# Patient Record
Sex: FEMALE | Race: BLACK OR AFRICAN AMERICAN | NOT HISPANIC OR LATINO | Employment: OTHER | URBAN - METROPOLITAN AREA
[De-identification: names, ages, dates, MRNs, and addresses within clinical notes are randomized per-mention and may not be internally consistent; named-entity substitution may affect disease eponyms.]

---

## 2023-11-22 ENCOUNTER — HOSPITAL ENCOUNTER (EMERGENCY)
Facility: CLINIC | Age: 68
Discharge: HOME | End: 2023-11-22
Attending: EMERGENCY MEDICINE | Admitting: EMERGENCY MEDICINE
Payer: COMMERCIAL

## 2023-11-22 VITALS
HEART RATE: 71 BPM | OXYGEN SATURATION: 97 % | DIASTOLIC BLOOD PRESSURE: 90 MMHG | TEMPERATURE: 97.6 F | RESPIRATION RATE: 16 BRPM | SYSTOLIC BLOOD PRESSURE: 155 MMHG

## 2023-11-22 DIAGNOSIS — R07.89 OTHER CHEST PAIN: ICD-10-CM

## 2023-11-22 LAB
ANION GAP SERPL CALCULATED.3IONS-SCNC: 10 MMOL/L (ref 7–15)
BASOPHILS # BLD AUTO: 0.1 10E3/UL (ref 0–0.2)
BASOPHILS NFR BLD AUTO: 1 %
BUN SERPL-MCNC: 16.5 MG/DL (ref 8–23)
CALCIUM SERPL-MCNC: 9.9 MG/DL (ref 8.8–10.2)
CHLORIDE SERPL-SCNC: 102 MMOL/L (ref 98–107)
CREAT SERPL-MCNC: 0.78 MG/DL (ref 0.51–0.95)
DEPRECATED HCO3 PLAS-SCNC: 29 MMOL/L (ref 22–29)
EGFRCR SERPLBLD CKD-EPI 2021: 83 ML/MIN/1.73M2
EOSINOPHIL # BLD AUTO: 0.1 10E3/UL (ref 0–0.7)
EOSINOPHIL NFR BLD AUTO: 2 %
ERYTHROCYTE [DISTWIDTH] IN BLOOD BY AUTOMATED COUNT: 11.9 % (ref 10–15)
GLUCOSE SERPL-MCNC: 98 MG/DL (ref 70–99)
HCT VFR BLD AUTO: 42.6 % (ref 35–47)
HGB BLD-MCNC: 14.7 G/DL (ref 11.7–15.7)
IMM GRANULOCYTES # BLD: 0 10E3/UL
IMM GRANULOCYTES NFR BLD: 0 %
LYMPHOCYTES # BLD AUTO: 2 10E3/UL (ref 0.8–5.3)
LYMPHOCYTES NFR BLD AUTO: 36 %
MCH RBC QN AUTO: 30.3 PG (ref 26.5–33)
MCHC RBC AUTO-ENTMCNC: 34.5 G/DL (ref 31.5–36.5)
MCV RBC AUTO: 88 FL (ref 78–100)
MONOCYTES # BLD AUTO: 0.4 10E3/UL (ref 0–1.3)
MONOCYTES NFR BLD AUTO: 7 %
NEUTROPHILS # BLD AUTO: 3.1 10E3/UL (ref 1.6–8.3)
NEUTROPHILS NFR BLD AUTO: 54 %
NRBC # BLD AUTO: 0 10E3/UL
NRBC BLD AUTO-RTO: 0 /100
PLATELET # BLD AUTO: 342 10E3/UL (ref 150–450)
POTASSIUM SERPL-SCNC: 4.6 MMOL/L (ref 3.4–5.3)
RBC # BLD AUTO: 4.85 10E6/UL (ref 3.8–5.2)
SODIUM SERPL-SCNC: 141 MMOL/L (ref 135–145)
TROPONIN T SERPL HS-MCNC: 6 NG/L
WBC # BLD AUTO: 5.7 10E3/UL (ref 4–11)

## 2023-11-22 PROCEDURE — 96372 THER/PROPH/DIAG INJ SC/IM: CPT | Performed by: EMERGENCY MEDICINE

## 2023-11-22 PROCEDURE — 99284 EMERGENCY DEPT VISIT MOD MDM: CPT

## 2023-11-22 PROCEDURE — 93005 ELECTROCARDIOGRAM TRACING: CPT

## 2023-11-22 PROCEDURE — 250N000011 HC RX IP 250 OP 636: Mod: JZ | Performed by: EMERGENCY MEDICINE

## 2023-11-22 PROCEDURE — 80048 BASIC METABOLIC PNL TOTAL CA: CPT | Performed by: EMERGENCY MEDICINE

## 2023-11-22 PROCEDURE — 36415 COLL VENOUS BLD VENIPUNCTURE: CPT | Performed by: EMERGENCY MEDICINE

## 2023-11-22 PROCEDURE — 85025 COMPLETE CBC W/AUTO DIFF WBC: CPT | Performed by: EMERGENCY MEDICINE

## 2023-11-22 PROCEDURE — 84484 ASSAY OF TROPONIN QUANT: CPT | Performed by: EMERGENCY MEDICINE

## 2023-11-22 RX ORDER — KETOROLAC TROMETHAMINE 15 MG/ML
15 INJECTION, SOLUTION INTRAMUSCULAR; INTRAVENOUS ONCE
Status: COMPLETED | OUTPATIENT
Start: 2023-11-22 | End: 2023-11-22

## 2023-11-22 RX ADMIN — KETOROLAC TROMETHAMINE 15 MG: 15 INJECTION, SOLUTION INTRAMUSCULAR; INTRAVENOUS at 18:37

## 2023-11-22 ASSESSMENT — ACTIVITIES OF DAILY LIVING (ADL): ADLS_ACUITY_SCORE: 35

## 2023-11-22 NOTE — ED TRIAGE NOTES
Left sided chest pain x 2 weeks. Seen in urgent care 1 week ago and a EKG and chest XR were competed. Came to ER tonight as chest pain continues. Pain has not worsened.

## 2023-11-23 NOTE — ED PROVIDER NOTES
History     Chief Complaint:  Chest Pain       The history is provided by the patient.      Carol Pierce is a 68 year old female with a history of hypertension and hyperlipidemia who presents to the ED for evaluation of persistent left sided chest discomfort. Patient reports that 2.5 weeks ago, she developed this left sided chest discomfort and lower right leg pain simultaneously. At that time, the chest discomforted was also in her back, but the discomfort in her back has now subsided. She went to urgent care for these symptoms and was diagnosed with gout. Patient was prescribed indocin for her gout and states that the doctor thought that it might help to alleviate her chest discomfort as well. Patient chest discomfort has not gone away and its persistent is the reason the patient came to the ED today. She describes this discomfort as localized sore muscles, but states that it feels internal. She rates it as a 4 out of 10. She states that she sleep on her stomach, but laying down on her stomach aggravates the chest pain causing her to have difficulties sleeping. She took tylenol a couple of days ago, which helped to alleviate the pain. She notes that pressing externally doesn't affect the discomfort. She denies shortness of breath, lack of balance, dizziness, or blurry vision.     Independent Historian:   None - Patient Only    Review of External Notes:   NA     Medications:    Aspirin 81 mg  Norvasc  Tenormin  Celexa    Past Medical History:    Major depression  Hypertension   Varicose veins in both of lower extremities  Hyperparathyroidism  Supraventricular tachycardia  Thyroid nodule  Chronic bilateral low back pain  Parathyroid adenoma  Glaucoma  Granuloma annulare  Hyperlipidemia     Past Surgical History:    Hysterectomy  Colonoscopy  Parathyroidectomy  Cholecystectomy    Physical Exam   Patient Vitals for the past 24 hrs:   BP Temp Pulse Resp SpO2   11/22/23 2030 (!) 155/90 -- 71 16 97 %   11/22/23  1759 (!) 167/91 -- -- -- --   11/22/23 1757 -- 97.6  F (36.4  C) 66 16 96 %     Physical Exam  General: Resting on the bed.  Head: No obvious trauma to head.  Ears, Nose, Throat:  External ears normal.  Nose normal.  No pharyngeal erythema, swelling or exudate.  Midline uvula. Moist mucus membranes.  Eyes:  Conjunctivae clear.   Neck: Normal range of motion.  Neck supple.   Chest wall: No tenderness to palpation  CV: Regular rate and rhythm.  No murmurs.      Respiratory: Effort normal and breath sounds normal.  No wheezing or crackles.   Gastrointestinal: Soft.  No distension. There is no tenderness.  There is no rigidity, no rebound and no guarding.   Musculoskeletal: Normal range of motion.  Non tender extremities to palpations.    Neuro: Alert. Moving all extremities appropriately.  Normal speech.    Skin: Skin is warm and dry.  No rash noted.   Psych: Normal mood and affect. Behavior is normal.     Emergency Department Course   ECG  ECG taken at 1808, ECG read at 1809  Sinus bradycardia with sinus arrhythmia  Nonspecific T wave abnormality  Abnormal ECG   No previous ECGs available  Rate 55 bpm. MN interval 196 ms. QRS duration 108 ms. QT/QTc 440/420 ms. P-R-T axes 38 -19 16.     Emergency Department Course & Assessments:     Interventions:  Medications   ketorolac (TORADOL) injection 15 mg (15 mg Intramuscular $Given 11/22/23 1837)     Consultations/Discussion of Management or Tests:  ED Course as of 11/22/23 2223 Wed Nov 22, 2023 1820 I obtained history and examined the patient as noted above.    2024 I rechecked the patient and explained findings. Patient is feeling better after the pain medications. We discussed plan for discharge, which patient agreed with.       Social Determinants of Health affecting care:   None    Disposition:  The patient was discharged to home.     Impression & Plan    CMS Diagnoses: None    Medical Decision Making:  Patient appears well on exam.  She is hemodynamically stable.   EKG shows no ischemic changes.  Troponin is negative.  CBC and BMP are within normal limits.  She is given Toradol.  On reevaluation, she reports her pain has subsided.  She recently had a chest x-ray, which was unremarkable.  She is not tachycardic or hypoxic.  Wells is 0. Her pain is likely muscular in etiology.  She is instructed to alternate Tylenol and ibuprofen for pain.  She is also encouraged to follow-up with her PCP.  Return precautions are given and she verbalizes understanding.  She is discharged home in stable condition.    Diagnosis:    ICD-10-CM    1. Other chest pain  R07.89         Discharge Medications:  There are no discharge medications for this patient.    Scribe Disclosure:  I, Nilo Sharpe, am serving as a scribe at 8:36 PM on 11/22/2023 to document services personally performed by Pierre Butcher MD, based on my observations and the provider's statements to me.   11/22/2023   Pierre Butcher MD Peery, Stephen, MD  11/23/23 0123

## 2023-11-23 NOTE — DISCHARGE INSTRUCTIONS
Your pain appears to be secondary to muscular pain.  We recommend that you try over-the-counter pain medication for the next week.  Please alternate scheduled Tylenol and ibuprofen.  You can take 1000 mg of Tylenol every 6 hours, and 800 mg of ibuprofen every 6 hours.  If you alternate, you will be taking one of the other every 3 hours.  We recommend that you follow-up with your primary care provider.  Please return the emergency department if you develop any new or concerning symptoms.

## 2023-11-24 LAB
ATRIAL RATE - MUSE: 55 BPM
DIASTOLIC BLOOD PRESSURE - MUSE: NORMAL MMHG
INTERPRETATION ECG - MUSE: NORMAL
P AXIS - MUSE: 38 DEGREES
PR INTERVAL - MUSE: 196 MS
QRS DURATION - MUSE: 108 MS
QT - MUSE: 440 MS
QTC - MUSE: 420 MS
R AXIS - MUSE: -19 DEGREES
SYSTOLIC BLOOD PRESSURE - MUSE: NORMAL MMHG
T AXIS - MUSE: 16 DEGREES
VENTRICULAR RATE- MUSE: 55 BPM